# Patient Record
Sex: MALE | Race: BLACK OR AFRICAN AMERICAN | NOT HISPANIC OR LATINO
[De-identification: names, ages, dates, MRNs, and addresses within clinical notes are randomized per-mention and may not be internally consistent; named-entity substitution may affect disease eponyms.]

---

## 2023-09-13 ENCOUNTER — NON-APPOINTMENT (OUTPATIENT)
Age: 63
End: 2023-09-13

## 2023-12-14 PROBLEM — Z00.00 ENCOUNTER FOR PREVENTIVE HEALTH EXAMINATION: Status: ACTIVE | Noted: 2023-12-14

## 2023-12-15 ENCOUNTER — APPOINTMENT (OUTPATIENT)
Dept: ORTHOPEDIC SURGERY | Facility: CLINIC | Age: 63
End: 2023-12-15
Payer: OTHER MISCELLANEOUS

## 2023-12-15 ENCOUNTER — APPOINTMENT (OUTPATIENT)
Dept: MRI IMAGING | Facility: CLINIC | Age: 63
End: 2023-12-15
Payer: OTHER MISCELLANEOUS

## 2023-12-15 ENCOUNTER — NON-APPOINTMENT (OUTPATIENT)
Age: 63
End: 2023-12-15

## 2023-12-15 VITALS — WEIGHT: 215 LBS | HEIGHT: 67 IN | BODY MASS INDEX: 33.74 KG/M2

## 2023-12-15 DIAGNOSIS — I10 ESSENTIAL (PRIMARY) HYPERTENSION: ICD-10-CM

## 2023-12-15 PROCEDURE — 73221 MRI JOINT UPR EXTREM W/O DYE: CPT | Mod: LT

## 2023-12-15 PROCEDURE — 99204 OFFICE O/P NEW MOD 45 MIN: CPT

## 2023-12-15 RX ORDER — METOPROLOL TARTRATE 75 MG/1
TABLET, FILM COATED ORAL
Refills: 0 | Status: ACTIVE | COMMUNITY

## 2023-12-15 RX ORDER — AMLODIPINE BESYLATE 5 MG/1
TABLET ORAL
Refills: 0 | Status: ACTIVE | COMMUNITY

## 2023-12-15 NOTE — IMAGING
[de-identified] : Elbow inspection reveals mild posterior swelling Unable to palpate distal triceps ROM limited by pain 4-/5 elbow extension Neurovascularly intact distally

## 2023-12-15 NOTE — DATA REVIEWED
[CT Scan] : CT scan [Outside X-rays] : outside x-rays [Left] : left [Elbow] : elbow [Report was reviewed and noted in the chart] : The report was reviewed and noted in the chart [I reviewed the films/CD and agree] : I reviewed the films/CD and agree

## 2023-12-15 NOTE — WORK
[Total (100%)] : total (100%) [Does not reveal pre-existing condition(s) that may affect treatment/prognosis] : does not reveal pre-existing condition(s) that may affect treatment/prognosis [Cannot return to work because ________] : cannot return to work because [unfilled] [No Rx restrictions] : No Rx restrictions. [I provided the services listed above] :  I provided the services listed above.

## 2023-12-15 NOTE — ASSESSMENT
[FreeTextEntry1] : Reviewed x-rays and CAT scan of left elbow after a traumatic fall at work.  Bony nir off of the olecranon suggesting a triceps injury.  Explained CAT scan is not the ideal test to evaluate for tendon injury.  He needs to be set up with an MRI and if it shows a complete distal triceps rupture he would benefit from surgical repair.  Follow-up to review results.  No work at this time.

## 2023-12-15 NOTE — HISTORY OF PRESENT ILLNESS
[de-identified] : 12/15/23: Patient is here for left elbow injury that occurred while at work on 12/14/23; . Slipped and while falling, hit elbow on an aluminum fixture. Pain is lateral. Swelling. Pain is lateral and radiates into forearm. Worsens with bending/lifting. No prior injury. Went to urgent care, got XR - fracture. Sent to [facility] for a CT on 12/11/23. Notes no improvement since initial injury. Currently not working.

## 2023-12-21 ENCOUNTER — APPOINTMENT (OUTPATIENT)
Dept: ORTHOPEDIC SURGERY | Facility: CLINIC | Age: 63
End: 2023-12-21
Payer: OTHER MISCELLANEOUS

## 2023-12-21 VITALS — WEIGHT: 215 LBS | HEIGHT: 67 IN | BODY MASS INDEX: 33.74 KG/M2

## 2023-12-21 PROCEDURE — 99214 OFFICE O/P EST MOD 30 MIN: CPT | Mod: 57

## 2023-12-21 PROCEDURE — L3670: CPT

## 2023-12-21 NOTE — ASSESSMENT
[FreeTextEntry1] : Reviewed left elbow MRI in detail.  As I suspect that he does have a complete rupture of the dorsal aspect of the triceps tendon.  He is very weak on exam today.  I have recommended acute surgical repair in order to restore strength and extension as this may become inoperable in the future.  Risk benefits and alternatives reviewed.  Postoperative course outlined.  He wants to proceed.  UltraSling dispensed in preparation for surgery during today's visit.  Surgery would not be possible without it.  The risks and benefits of surgery have been discussed. Risks include but are not limited to bleeding, infection, reaction to anesthesia, injury to blood vessels and nerves, malunion, nonunion, DVT, PE, necessity of repeat surgery, chronic pain, loss of limb and death. The patient understands the risks and agrees with the surgical plan. All questions have been answered.

## 2023-12-21 NOTE — IMAGING
[de-identified] : Elbow inspection reveals mild posterior swelling Unable to palpate distal triceps ROM limited by pain 4-/5 elbow extension Neurovascularly intact distally

## 2023-12-21 NOTE — HISTORY OF PRESENT ILLNESS
[Work related] : work related [Not working due to injury] : Work status: not working due to injury [] : yes [de-identified] : 12/15/23: Patient is here for left elbow injury that occurred while at work on 12/14/23; . Slipped and while falling, hit elbow on an aluminum fixture. Pain is lateral. Swelling. Pain is lateral and radiates into forearm. Worsens with bending/lifting. No prior injury. Went to urgent care, got XR - fracture. Sent to [facility] for a CT on 12/11/23. Notes no improvement since initial injury. Currently not working.   12/21/23: Here for left elbow MRI results. No improvement since last visit.

## 2024-01-16 ENCOUNTER — APPOINTMENT (OUTPATIENT)
Age: 64
End: 2024-01-16
Payer: OTHER MISCELLANEOUS

## 2024-01-16 PROCEDURE — 24105 EXCISION OLECRANON BURSA: CPT | Mod: AS,59,LT

## 2024-01-16 PROCEDURE — 24105 EXCISION OLECRANON BURSA: CPT | Mod: 59,LT

## 2024-01-16 PROCEDURE — 24342 REPAIR OF RUPTURED TENDON: CPT | Mod: LT

## 2024-01-16 PROCEDURE — 24342 REPAIR OF RUPTURED TENDON: CPT | Mod: AS,LT

## 2024-01-16 RX ORDER — OXYCODONE AND ACETAMINOPHEN 5; 325 MG/1; MG/1
5-325 TABLET ORAL
Qty: 35 | Refills: 0 | Status: ACTIVE | COMMUNITY
Start: 2024-01-16 | End: 1900-01-01

## 2024-01-22 ENCOUNTER — APPOINTMENT (OUTPATIENT)
Dept: ORTHOPEDIC SURGERY | Facility: CLINIC | Age: 64
End: 2024-01-22
Payer: OTHER MISCELLANEOUS

## 2024-01-22 PROCEDURE — L3761: CPT | Mod: LT

## 2024-01-22 PROCEDURE — 99024 POSTOP FOLLOW-UP VISIT: CPT

## 2024-01-22 NOTE — HISTORY OF PRESENT ILLNESS
[Work related] : work related [Sudden] : sudden [5] : 5 [4] : 4 [Localized] : localized [Tightness] : tightness [Intermittent] : intermittent [Rest] : rest [Not working due to injury] : Work status: not working due to injury [] : yes [de-identified] : 12/15/23: Patient is here for left elbow injury that occurred while at work on 12/14/23; . Slipped and while falling, hit elbow on an aluminum fixture. Pain is lateral. Swelling. Pain is lateral and radiates into forearm. Worsens with bending/lifting. No prior injury. Went to urgent care, got XR - fracture. Sent to [facility] for a CT on 12/11/23. Notes no improvement since initial injury. Currently not working.   12/21/23: Here for left elbow MRI results. No improvement since last visit.   01822/24 follow up s/p left biceps  [FreeTextEntry3] : 12/04/23 [de-identified] : with activity [de-identified] : sx

## 2024-01-22 NOTE — ASSESSMENT
[FreeTextEntry1] : First postop splint removed.  Incision well-appearing. Pain well-controlled. He is fitted today with a elbow brace locked in 30 degrees of flexion. Will dial up the flexion weekly to protect the repair site. Precautions and restrictions reviewed I spent a considerable amount of time with the patient explaining this. He will start physical therapy right away and come back in 6 weeks for follow-up NO work

## 2024-03-04 ENCOUNTER — APPOINTMENT (OUTPATIENT)
Dept: ORTHOPEDIC SURGERY | Facility: CLINIC | Age: 64
End: 2024-03-04
Payer: OTHER MISCELLANEOUS

## 2024-03-04 ENCOUNTER — NON-APPOINTMENT (OUTPATIENT)
Age: 64
End: 2024-03-04

## 2024-03-04 PROCEDURE — 99024 POSTOP FOLLOW-UP VISIT: CPT

## 2024-03-04 NOTE — HISTORY OF PRESENT ILLNESS
[Work related] : work related [Sudden] : sudden [4] : 4 [5] : 5 [Localized] : localized [Tightness] : tightness [Intermittent] : intermittent [Rest] : rest [] : yes [Not working due to injury] : Work status: not working due to injury [de-identified] : 12/15/23: Patient is here for left elbow injury that occurred while at work on 12/14/23; . Slipped and while falling, hit elbow on an aluminum fixture. Pain is lateral. Swelling. Pain is lateral and radiates into forearm. Worsens with bending/lifting. No prior injury. Went to urgent care, got XR - fracture. Sent to [facility] for a CT on 12/11/23. Notes no improvement since initial injury. Currently not working.   12/21/23: Here for left elbow MRI results. No improvement since last visit.   01822/24 follow up s/p left biceps   03/04/24 follow up s/p left biceps  [FreeTextEntry3] : 12/04/23 [de-identified] : with activity [de-identified] : chava pt

## 2024-03-04 NOTE — ASSESSMENT
[FreeTextEntry1] : He is now just over 6 weeks from a triceps repair doing excellent.  He is in physical therapy and range of motion is almost restored.  Continue with range of motion exercises and at the 2-month christiana we will begin light strengthening follow-up in 6 weeks to reassess.  No work at this time unless sedentary. 
06-Dec-2018 13:38

## 2024-04-15 ENCOUNTER — APPOINTMENT (OUTPATIENT)
Dept: ORTHOPEDIC SURGERY | Facility: CLINIC | Age: 64
End: 2024-04-15
Payer: OTHER MISCELLANEOUS

## 2024-04-15 ENCOUNTER — NON-APPOINTMENT (OUTPATIENT)
Age: 64
End: 2024-04-15

## 2024-04-15 VITALS — BODY MASS INDEX: 33.74 KG/M2 | WEIGHT: 215 LBS | HEIGHT: 67 IN

## 2024-04-15 DIAGNOSIS — S46.312D STRAIN OF MUSCLE, FASCIA AND TENDON OF TRICEPS, LEFT ARM, SUBSEQUENT ENCOUNTER: ICD-10-CM

## 2024-04-15 PROCEDURE — 99024 POSTOP FOLLOW-UP VISIT: CPT

## 2024-04-15 NOTE — ASSESSMENT
[FreeTextEntry1] : Plan on return to work at the beginning of next month once he recovers the last bit of straining.  He tells me he needs to be on 100% prior to returning to work believed to in order to lift and push without any weakness.

## 2024-04-15 NOTE — WORK
[Does not reveal pre-existing condition(s) that may affect treatment/prognosis] : does not reveal pre-existing condition(s) that may affect treatment/prognosis [No Rx restrictions] : No Rx restrictions. [I provided the services listed above] :  I provided the services listed above. [Mild Partial] : mild partial [Can return to work without limitations on ______] : can return to work without limitations on [unfilled]

## 2024-04-15 NOTE — HISTORY OF PRESENT ILLNESS
[Work related] : work related [Sudden] : sudden [Localized] : localized [Tightness] : tightness [Intermittent] : intermittent [Rest] : rest [Not working due to injury] : Work status: not working due to injury [] : yes [4] : 4 [0] : 0 [de-identified] : 12/15/23: Patient is here for left elbow injury that occurred while at work on 12/14/23; . Slipped and while falling, hit elbow on an aluminum fixture. Pain is lateral. Swelling. Pain is lateral and radiates into forearm. Worsens with bending/lifting. No prior injury. Went to urgent care, got XR - fracture. Sent to [facility] for a CT on 12/11/23. Notes no improvement since initial injury. Currently not working.   12/21/23: Here for left elbow MRI results. No improvement since last visit.   01822/24 follow up s/p left biceps   03/04/24 follow up s/p left biceps   4.15.24 follow up s/p left biceps. Patient states he is feeling better.  [FreeTextEntry3] : 12/04/23 [de-identified] : with activity [de-identified] : chava pt

## 2024-09-30 ENCOUNTER — APPOINTMENT (OUTPATIENT)
Dept: ORTHOPEDIC SURGERY | Facility: CLINIC | Age: 64
End: 2024-09-30
Payer: OTHER MISCELLANEOUS

## 2024-09-30 DIAGNOSIS — S46.312D STRAIN OF MUSCLE, FASCIA AND TENDON OF TRICEPS, LEFT ARM, SUBSEQUENT ENCOUNTER: ICD-10-CM

## 2024-09-30 PROCEDURE — 99243 OFF/OP CNSLTJ NEW/EST LOW 30: CPT

## 2024-09-30 NOTE — IMAGING
[de-identified] : Left Elbow Exam: Inspection: Incision well healed Palpation: Minimal tenderness to palpation over triceps Range of Motion: Extension 0;-140; 0-140; 0-140 Pronation/supination: 70-90; 70-90; 70-90 Strength: 5/5 strength in flexion, extension, supination, pronation Special testing: No Varus or Valgus instability Motor and sensory intact distally  Right Elbow Exam: Inspection: No swelling, no ecchymosis, no warmth, no fluctuance. Palpation: No tenderness to palpation over elbow Range of Motion: Extension 0-150 (x3); Supination/Pronation 90-90 (x3) Strength: 5/5 strength in flexion, extension, supination, pronation Special testing: No Varus or Valgus instability Motor and sensory intact distally

## 2024-09-30 NOTE — WORK
[Has the patient reached Maximum Medical Improvement? If yes, indicate date___] : Yes, on [unfilled] [Is there permanent partial impairment?] : Yes [FreeTextEntry6] : Left elbow 20% for distal triceps rupture requiring surgical repair and mild residual range of motion deficits (2018 guidelines special consideration 5.5.6 rupture at the distal insertion point of the biceps would be equivalent to a rupture of a distal triceps considering they have the same CPT procedural code for surgical repair)

## 2024-09-30 NOTE — HISTORY OF PRESENT ILLNESS
[Work related] : work related [Sudden] : sudden [0] : 0 [Localized] : localized [Rest] : rest [Not working due to injury] : Work status: not working due to injury [] : yes [2] : 2 [de-identified] : 12/15/23: Patient is here for left elbow injury that occurred while at work on 12/14/23; . Slipped and while falling, hit elbow on an aluminum fixture. Pain is lateral. Swelling. Pain is lateral and radiates into forearm. Worsens with bending/lifting. No prior injury. Went to urgent care, got XR - fracture. Sent to [facility] for a CT on 12/11/23. Notes no improvement since initial injury. Currently not working.   12/21/23: Here for left elbow MRI results. No improvement since last visit.   01822/24 follow up s/p left biceps   03/04/24 follow up s/p left biceps   4.15.24 follow up s/p left biceps. Patient states he is feeling better.   9.30.24 Patient here for left bicep pain. Patient states improvement since last visit [FreeTextEntry3] : 12/04/23 [de-identified] : with activity [de-identified] : chava pt